# Patient Record
(demographics unavailable — no encounter records)

---

## 2025-07-10 NOTE — WORK
[Sprain/Strain] : sprain/strain [Was the competent medical cause of the injury] : was the competent medical cause of the injury [Are consistent with the injury] : are consistent with the injury [Consistent with my objective findings] : consistent with my objective findings [Total (100%)] : total (100%) [FreeTextEntry1] : OOW
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
## 2025-07-10 NOTE — PHYSICAL EXAM
[Right] : right knee [5___] : hamstring 5[unfilled]/5 [Equivocal] : equivocal Karen [] : no erythema [TWNoteComboBox7] : flexion 110 degrees [de-identified] : extension 5 degrees

## 2025-07-10 NOTE — DISCUSSION/SUMMARY
[de-identified] : The patient was advised of the diagnosis. The natural history of the pathology was explained in full to the patient in layman's terms. The risks and benefits of surgical and non-surgical treatment alternatives were explained in full to the patient. All questions were answered.

## 2025-07-10 NOTE — ASSESSMENT
[FreeTextEntry1] : 07/10/2025: Reviewed 07/04/25 [facility] RT knee x-rays, 3 views which reveals no fx/abnormalities.  Underlying pathology reviewed and treatment options discussed. Obtain MRI RT knee R/O tears.  Activity modification as tolerated. HKB fitted and provided. Cane use outlined.  Prescribed Meloxicam 15 mg - I discussed the proper use of this medication and potential side effects. She will remain OOW.  Apply ice to affected area. Questions addressed. Follow up after MRI.  The documentation recorded by the scribe accurately reflects the service I personally performed and the decisions made by me. I, Prerna Pepe, attest that this documentation has been prepared under the direction and in the presence of Provider Jose Salazar MD.   The patient was seen by Jose Salazar MD.

## 2025-07-24 NOTE — PHYSICAL EXAM
[Right] : right knee [5___] : hamstring 5[unfilled]/5 [Equivocal] : equivocal Karen [] : no lateral joint line tenderness [TWNoteComboBox7] : flexion 110 degrees [de-identified] : extension 5 degrees

## 2025-07-24 NOTE — DISCUSSION/SUMMARY
[de-identified] : The patient was advised of the diagnosis. The natural history of the pathology was explained in full to the patient in layman's terms. The risks and benefits of surgical and non-surgical treatment alternatives were explained in full to the patient. All questions were answered.
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
## 2025-07-24 NOTE — PHYSICAL EXAM
[Right] : right knee [5___] : hamstring 5[unfilled]/5 [Equivocal] : equivocal Karen [] : no lateral joint line tenderness [TWNoteComboBox7] : flexion 110 degrees [de-identified] : extension 5 degrees

## 2025-07-24 NOTE — DATA REVIEWED
[MRI] : MRI [Right] : of the right [Knee] : knee [Report was reviewed and noted in the chart] : The report was reviewed and noted in the chart [I independently reviewed and interpreted images and report] : I independently reviewed and interpreted images and report [FreeTextEntry1] : [facility] MRI RIGHT KNEE 07/18/25: 1. Slight MCL sprain with mild medial soft tissue swelling.  2. Slight effusion and small popliteal cyst.  3. Mild medial distal quadriceps tendonitis without tear.  4. Small lobulated fluid collecting above the anterior medial tibial plateau measuring 1.4 cm potentially representing a small ganglion.  5. NO evidence of acute osseous injury, meniscal tear, chondral defect or loose body.  6. Findings suggesting a 9mm bone island in the proximal lateral tibial metaphysis which appears nonaggressive.

## 2025-07-24 NOTE — ASSESSMENT
[FreeTextEntry1] : 07/10/2025: Reviewed 07/04/25 [facility] RT knee x-rays, 3 views which reveals no fx/abnormalities.  Underlying pathology reviewed and treatment options discussed. Obtain MRI RT knee R/O tears.  Activity modification as tolerated. HKB fitted and provided. Cane use outlined.  Prescribed Meloxicam 15 mg - I discussed the proper use of this medication and potential side effects. She will remain OOW.  Apply ice to affected area. Questions addressed. Follow up after MRI.  07/24/2025: MRI reviewed and discussed. Based on my independent interpretation of the MRI images there's slight MCL sprain with mild medial soft tissue swelling, effusion small popliteal cyst, mild medial distal quad tendonitis.  Underlying pathology and treatment options discussed. She reports improvements.  Can RTW on 08/18/25 with no restrictions.  Mobic use prn.  Activity modification as tolerated. Questions addressed. Follow up as needed.   The documentation recorded by the scribe accurately reflects the service I personally performed and the decisions made by me. I, Prerna Pepe, attest that this documentation has been prepared under the direction and in the presence of Provider Jose Salazar MD.   The patient was seen by Jose Salazar MD.

## 2025-07-24 NOTE — DISCUSSION/SUMMARY
[de-identified] : The patient was advised of the diagnosis. The natural history of the pathology was explained in full to the patient in layman's terms. The risks and benefits of surgical and non-surgical treatment alternatives were explained in full to the patient. All questions were answered.

## 2025-07-24 NOTE — WORK
[Sprain/Strain] : sprain/strain [Was the competent medical cause of the injury] : was the competent medical cause of the injury [Are consistent with the injury] : are consistent with the injury [Consistent with my objective findings] : consistent with my objective findings [Total (100%)] : total (100%) [FreeTextEntry1] : Can RTW on 08/18/25 with no restrictions.